# Patient Record
Sex: FEMALE | Race: WHITE
[De-identification: names, ages, dates, MRNs, and addresses within clinical notes are randomized per-mention and may not be internally consistent; named-entity substitution may affect disease eponyms.]

---

## 2017-03-31 ENCOUNTER — HOSPITAL ENCOUNTER (EMERGENCY)
Dept: HOSPITAL 31 - C.ER | Age: 22
Discharge: HOME | End: 2017-03-31
Payer: COMMERCIAL

## 2017-03-31 VITALS — HEART RATE: 85 BPM | SYSTOLIC BLOOD PRESSURE: 142 MMHG | DIASTOLIC BLOOD PRESSURE: 75 MMHG | RESPIRATION RATE: 18 BRPM

## 2017-03-31 VITALS — TEMPERATURE: 98 F | OXYGEN SATURATION: 98 %

## 2017-03-31 DIAGNOSIS — M79.645: Primary | ICD-10-CM

## 2017-03-31 NOTE — C.PDOC
History Of Present Illness


21 year old patient presents to the ED complaining of pain to the left index 

finger since this morning. Patient states it hurts to move the finger. Patient 

denies any injury or taking medication for the pain.


Time Seen by Provider: 03/31/17 14:14


Chief Complaint (Nursing): Upper Extremity Problem/Injury


History Per: Patient


History/Exam Limitations: no limitations


Onset/Duration Of Symptoms: Hrs (this morning)


Current Symptoms Are (Timing): Still Present


Quality: "Pain"


Severity: Mild


Pain Scale Rating Of: 3


Exacerbating Factor(s): Movement


Recent travel outside of the [place]: No





Past Medical History


Reviewed: Historical Data, Nursing Documentation, Vital Signs


Vital Signs: 


 Last Vital Signs











Temp  98 F   03/31/17 14:02


 


Pulse  85   03/31/17 14:46


 


Resp  18   03/31/17 14:46


 


BP  142/75   03/31/17 14:46


 


Pulse Ox  98   03/31/17 16:08











Family History: States: Unknown Family Hx





- Social History


Hx Alcohol Use: Yes


Hx Substance Use: No





Review Of Systems


Except As Marked, All Systems Reviewed And Found Negative.


Musculoskeletal: Positive for: Hand Pain (left index finger)





Physical Exam





- Physical Exam


Appears: Non-toxic, No Acute Distress


Skin: Warm, Dry


Cardiovascular: Rhythm Regular


Extremity: Capillary Refill (<2 seconds), No Deformity, No Swelling, Other (

left index finger: (+)painful ROM, (+)mild tenderness to PIP, (-)swelling, (-)

discoloration; normal radial pulse)


Pulses: Left Radial: Normal, Right Radial: Normal


Neurological/Psych: Oriented x3, Normal Speech, Normal Cognition, Normal 

Sensation


Gait: Steady





ED Course And Treatment


O2 Sat by Pulse Oximetry: 98 (RA)


Pulse Ox Interpretation: Normal





- Other Rad


  ** left index 


X-Ray: Interpreted by Me, Viewed By Me, Read By Radiologist (Soheila Nesbitt MD)


Interpretation: No fractures or dislocations





Medical Decision Making


Medical Decision Making: 


Impression: 21 year old female with left index finger pain


Plan:


* Left hand 2nd digit


* Reassess and disposition


Progress:


Xray reviewed showing no fracture or dislocation. Finger splint applied by CP 

and checked by me. Recommend motrin for pain and symptoms likely tendonitis. 

Advise follow up with ortho if pain persists





Disposition


Counseled Patient/Family Regarding: Need For Followup, Rx Given





- Disposition


Referrals: 


Rodger Marquez MD [Staff Provider] - 


Disposition: HOME/ ROUTINE


Disposition Time: 14:45


Condition: GOOD


Additional Instructions: 


Your xray was normal , no fracture or malalignment


Take motrin as needed for pain, with food to not upset stomach


Follow up with orthopedic if pain persists


Prescriptions: 


Ibuprofen [Motrin] 600 mg PO Q8 #30 tab


Instructions:  Tendinitis (ED)





- POA


Present On Arrival: None





- Clinical Impression


Clinical Impression: 


 Pain of finger of left hand





- PA / NP / Resident Statement


MD/DO has reviewed & agrees with the documentation as recorded.





- Scribe Statement


The provider has reviewed the documentation as recorded by the Scribe


Nidia Noble





All medical record entries made by the Scribe were at my direction and 

personally dictated by me. I have reviewed the chart and agree that the record 

accurately reflects my personal performance of the history, physical exam, 

medical decision making, and the department course for this patient. I have 

also personally directed, reviewed, and agree with the discharge instructions 

and disposition.

## 2017-03-31 NOTE — RAD
PROCEDURE:  Left Index finger radiographs.



HISTORY:

pain  for one day



COMPARISON:

None.



TECHNIQUE:

AP radiograph of the left hand, as well as spot oblique and lateral 

images of index finger were obtained.



FINDINGS:



LEFT INDEX FINGER:

Normal left index finger, without fracture or focal lesion. Remainder 

of the left hand (as seen on the AP view) grossly intact.



JOINTS:

Normal. 



SOFT TISSUES:

Normal. 



OTHER FINDINGS:

None.



IMPRESSION:

No acute fracture or dislocation.

## 2019-01-27 ENCOUNTER — HOSPITAL ENCOUNTER (EMERGENCY)
Dept: HOSPITAL 42 - ED | Age: 24
LOS: 1 days | Discharge: HOME | End: 2019-01-28
Payer: COMMERCIAL

## 2019-01-27 VITALS — HEART RATE: 90 BPM | DIASTOLIC BLOOD PRESSURE: 78 MMHG | SYSTOLIC BLOOD PRESSURE: 124 MMHG

## 2019-01-27 VITALS — BODY MASS INDEX: 37.1 KG/M2

## 2019-01-27 VITALS — RESPIRATION RATE: 20 BRPM

## 2019-01-27 DIAGNOSIS — Z87.891: ICD-10-CM

## 2019-01-27 DIAGNOSIS — R07.89: Primary | ICD-10-CM

## 2019-01-27 LAB
ALBUMIN SERPL-MCNC: 4.6 G/DL (ref 3–4.8)
ALBUMIN/GLOB SERPL: 1 {RATIO} (ref 1.1–1.8)
ALT SERPL-CCNC: 70 U/L (ref 7–56)
AST SERPL-CCNC: 50 U/L (ref 14–36)
BUN SERPL-MCNC: 5 MG/DL (ref 7–21)
CALCIUM SERPL-MCNC: 10.2 MG/DL (ref 8.4–10.5)
ERYTHROCYTE [DISTWIDTH] IN BLOOD BY AUTOMATED COUNT: 13.3 % (ref 11.5–14.5)
GFR NON-AFRICAN AMERICAN: > 60
HGB BLD-MCNC: 14.9 G/DL (ref 12–16)
MCH RBC QN AUTO: 29.9 PG (ref 25–35)
MCHC RBC AUTO-ENTMCNC: 31.7 G/DL (ref 31–37)
MCV RBC AUTO: 94.4 FL (ref 80–105)
PLATELET # BLD: 438 10^3/UL (ref 120–450)
PMV BLD AUTO: 8.9 FL (ref 7–11)
RBC # BLD AUTO: 4.98 10^6/UL (ref 3.5–6.1)
TROPONIN I SERPL-MCNC: < 0.01 NG/ML
WBC # BLD AUTO: 10.7 10^3/UL (ref 4.5–11)

## 2019-01-27 PROCEDURE — 71045 X-RAY EXAM CHEST 1 VIEW: CPT

## 2019-01-27 PROCEDURE — 82550 ASSAY OF CK (CPK): CPT

## 2019-01-27 PROCEDURE — 80053 COMPREHEN METABOLIC PANEL: CPT

## 2019-01-27 PROCEDURE — 84484 ASSAY OF TROPONIN QUANT: CPT

## 2019-01-27 PROCEDURE — 85027 COMPLETE CBC AUTOMATED: CPT

## 2019-01-27 PROCEDURE — 83615 LACTATE (LD) (LDH) ENZYME: CPT

## 2019-01-27 PROCEDURE — 96374 THER/PROPH/DIAG INJ IV PUSH: CPT

## 2019-01-27 PROCEDURE — 93005 ELECTROCARDIOGRAM TRACING: CPT

## 2019-01-27 PROCEDURE — 99283 EMERGENCY DEPT VISIT LOW MDM: CPT

## 2019-01-27 PROCEDURE — 81025 URINE PREGNANCY TEST: CPT

## 2019-01-27 NOTE — ED PDOC
Arrival/HPI





- General


Chief Complaint: Chest Pain


Time Seen by Provider: 01/27/19 21:31


Historian: Patient





- History of Present Illness


Narrative History of Present Illness (Text): 


01/27/19 21:46


Denis Cintron is a 23 year old female former smoker, with no significant 

past medical history, who presents to the Emergency department complaining of 

chest pain. Patient states she developed chest pain while waiting for a delivery

at approximately 19:00 tonight. Patient reports pain is reproducible with 

palpation of the area and deep inspiration.No hx. of any trauma.Denies drug use.

Patient denies any fever, chills, shortness of breath, nausea, vomiting, 

diarrhea, urinary symptoms, back pain, neck pain, headache, dizziness, or any 

other complaints.





Symptom Onset: Gradual


Symptom Course: Unchanged


Activities at Onset: Light


Context: Home





Past Medical History





- Provider Review


Nursing Documentation Reviewed: Yes





- Infectious Disease


Hx of Infectious Diseases: None





- Reproductive


Menopause: No





Family/Social History





- Physician Review


Nursing Documentation Reviewed: Yes


Family/Social History: Unknown Family HX





Allergies/Home Meds


Allergies/Adverse Reactions: 


Allergies





No Known Allergies Allergy (Verified 01/28/19 08:21)


   











Review of Systems





- Physician Review


All systems were reviewed & negative as marked: Yes





- Review of Systems


Constitutional: Other (+generalized weakness)


Eyes: Normal


ENT: Normal


Respiratory: Normal.  absent: SOB, Cough


Cardiovascular: Chest Pain


Gastrointestinal: Normal.  absent: Abdominal Pain, Diarrhea, Nausea, Vomiting


Genitourinary Female: Normal.  absent: Dysuria, Frequency, Hematuria, Urine 

Output Changes


Musculoskeletal: Normal.  absent: Back Pain, Neck Pain


Skin: Normal.  absent: Rash


Neurological: Normal.  absent: Headache, Dizziness


Endocrine: Normal


Hemo/Lymphatic: Normal


Psychiatric: Normal





Physical Exam


Vital Signs Reviewed: Yes





Vital Signs











  Temp Pulse Resp BP Pulse Ox


 


 01/27/19 21:33  98.9 F  100 H  20  145/89  100











Temperature: Afebrile


Blood Pressure: Normal


Pulse: Regular


Respiratory Rate: Normal


Appearance: Positive for: Well-Appearing, Non-Toxic, Comfortable


Pain Distress: None


Mental Status: Positive for: Alert and Oriented X 3





- Systems Exam


Head: Present: Atraumatic, Normocephalic


Pupils: Present: PERRL


Extroacular Muscles: Present: EOMI


Conjunctiva: Present: Normal


Mouth: Present: Moist Mucous Membranes


Neck: Present: Normal Range of Motion


Respiratory/Chest: Present: Tender to Palpation (anterior chest wall tenderness 

on palpation).  No: Respiratory Distress, Accessory Muscle Use


Cardiovascular: Present: Regular Rate and Rhythm, Normal S1, S2.  No: Murmurs


Abdomen: No: Tenderness, Distention, Peritoneal Signs


Back: Present: Normal Inspection.  No: CVA Tenderness, Midline Tenderness, 

Paraspinal Tenderness


Upper Extremity: Present: Normal Inspection.  No: Cyanosis, Edema


Lower Extremity: Present: Normal Inspection.  No: Edema


Neurological: Present: GCS=15, CN II-XII Intact, Speech Normal


Skin: Present: Warm, Dry, Normal Color.  No: Rashes


Psychiatric: Present: Alert, Oriented x 3, Normal Insight, Normal Concentration





Medical Decision Making


ED Course and Treatment: 


01/27/19 21:46


Impression:


23 year old female complaining of chest pain, with palpation and deep 

inspiration, since 19:00.





Plan:


-- EKG


-- Chest X-ray


-- Labs, cardiac enzymes


-- Reassess and disposition





Progress Notes:


01/27/19 22:03


Reviewed EKG, NSR at 99 bpm. No ST-segment elevations or depressions, no T-wave 

inversions, normal intervals.





01/28/19 00:00


Chest X-ray reviewed, shows no acute processes.








- Lab Interpretations


I have reviewed the lab results: Yes





- RAD Interpretation


: ED Physician





- EKG Interpretation


Interpreted by ED Physician: Yes


Type: 12 lead EKG





- Scribe Statement


The provider has reviewed the documentation as recorded by the Emmaibtramaine Luna





Provider Scribe Attestation:


All medical record entries made by the Scribe were at my direction and 

personally dictated by me. I have reviewed the chart and agree that the record 

accurately reflects my personal performance of the history, physical exam, 

medical decision making, and the department course for this patient. I have also

personally directed, reviewed, and agree with the discharge instructions and 

disposition.








Disposition/Present on Arrival





- Present on Arrival


Any Indicators Present on Arrival: No


History of DVT/PE: No


History of Uncontrolled Diabetes: No


Urinary Catheter: No


History of Decub. Ulcer: No


History Surgical Site Infection Following: None





- Disposition


Have Diagnosis and Disposition been Completed?: Yes


Diagnosis: 


 Musculoskeletal chest pain





Disposition: HOME/ ROUTINE


Disposition Time: 00:18


Patient Plan: Discharge


Condition: GOOD


Discharge Instructions (ExitCare):  Costochondritis (DC), Chest Pain (ED)


Additional Instructions: 


Rest/no strenuous physical activity next few days/medication as 

prescribed/follow up with your doctor this week


Prescriptions: 


Naproxen [Naprosyn Tab] 375 mg PO BID PRN #14 tab


 PRN Reason: Pain, Moderate (4-7)


Referrals: 


Bartolo Raygoza MD [Primary Care Provider] - Follow up with primary


Forms:  [employer] (English)

## 2019-01-28 VITALS — TEMPERATURE: 98.2 F | OXYGEN SATURATION: 100 %

## 2019-01-28 NOTE — RAD
Date of service: 



01/27/2019



HISTORY:

 pain 



COMPARISON:

No prior. 



FINDINGS:



LUNGS:

No active pulmonary disease.



PLEURA:

No significant pleural effusion identified, no pneumothorax apparent.



CARDIOVASCULAR:

No aortic atherosclerotic calcification present.



Normal cardiac size. No pulmonary vascular congestion. 



OSSEOUS STRUCTURES:

No significant abnormalities.



VISUALIZED UPPER ABDOMEN:

Normal.



OTHER FINDINGS:

None.



IMPRESSION:

No acute fracture or destructive bony lesion identified.

## 2019-01-28 NOTE — CARD
--------------- APPROVED REPORT --------------





Date of service: 01/27/2019



EKG Measurement

Heart [id number]

[id number]

[id number]

[id number]

[id number]



<Conclusion>

Normal sinus rhythm

Normal ECG